# Patient Record
Sex: FEMALE | Race: WHITE | Employment: FULL TIME | URBAN - METROPOLITAN AREA
[De-identification: names, ages, dates, MRNs, and addresses within clinical notes are randomized per-mention and may not be internally consistent; named-entity substitution may affect disease eponyms.]

---

## 2017-08-09 ENCOUNTER — HOSPITAL ENCOUNTER (EMERGENCY)
Facility: CLINIC | Age: 27
Discharge: HOME | End: 2017-08-09
Attending: EMERGENCY MEDICINE | Admitting: EMERGENCY MEDICINE

## 2017-08-09 VITALS
TEMPERATURE: 97.9 F | DIASTOLIC BLOOD PRESSURE: 79 MMHG | BODY MASS INDEX: 26.98 KG/M2 | RESPIRATION RATE: 16 BRPM | WEIGHT: 146.6 LBS | HEIGHT: 62 IN | SYSTOLIC BLOOD PRESSURE: 117 MMHG | OXYGEN SATURATION: 100 %

## 2017-08-09 DIAGNOSIS — F41.9 ANXIETY: ICD-10-CM

## 2017-08-09 PROCEDURE — 90791 PSYCH DIAGNOSTIC EVALUATION: CPT

## 2017-08-09 PROCEDURE — 99285 EMERGENCY DEPT VISIT HI MDM: CPT | Mod: 25

## 2017-08-09 RX ORDER — PAROXETINE 10 MG/1
10 TABLET, FILM COATED ORAL AT BEDTIME
Qty: 21 TABLET | Refills: 0 | Status: SHIPPED | OUTPATIENT
Start: 2017-08-09

## 2017-08-09 ASSESSMENT — ENCOUNTER SYMPTOMS
NERVOUS/ANXIOUS: 1
HALLUCINATIONS: 0

## 2017-08-09 NOTE — DISCHARGE INSTRUCTIONS
Start back on the paxil 10 mg daily  After one week, increase to 20 mg daily  Follow up with primary care provider  May want to schedule a psychiatry appointment  Follow up with therapy    Discharge Instructions  Mental Health Concerns    You were seen today for mental health concerns, such as depression, anxiety, or suicidal thinking. Your provider feels that you do not require hospitalization at this time. However, your symptoms may become worse, and you may need to return to the Emergency Department. Most treatments of depression and suicidal thoughts are a process rather than a single intervention.  Medications and counseling can take several weeks or more to help.    Generally, every Emergency Department visit should have a follow-up clinic visit with either a primary or a specialty clinic/provider. Please follow-up as instructed by your emergency provider today.    By accepting these discharge instructions:    You promise to not harm yourself or others.    You agree that if you feel you are becoming unable to keep that promise, you will do something to help yourself before you do anything to harm yourself or others.     You agree to keep any safety plan arranged on your visit here today.    You agree to take any medication prescribed or recommended by your provider.    If you are getting worse, you can contact a friend or a family member, contact your counselor or family provider, contact a crisis line, or other options discussed with the provider or therapist today.    At any time, you can call 911 and return to the Emergency Department for more help.    You understand that follow-up is essential to your treatment, and you will make and keep appointments recommended on your visit today.    How to improve your mental health and prevent suicide:    Involve others by letting family, friends, counselors know.  Do not isolate yourself.    Avoid alcohol or drugs. Remove weapons, poisons from your home.    Try to  stick to routines for eating, sleeping and getting regular exercise.      Try to get into sunlight. Bright natural light not only treats seasonal affective disorder but also depression.    Increase safe activities that you enjoy.    If you feel worse, contact [phone] ([phone]), or call 911, or your primary provider/counselor for additional assistance.    If you were given a prescription for medicine here today, be sure to read all of the information (including the package insert) that comes with your prescription.  This will include important information about the medicine, its side effects, and any warnings that you need to know about.  The pharmacist who fills the prescription can provide more information and answer questions you may have about the medicine.  If you have questions or concerns that the pharmacist cannot address, please call or return to the Emergency Department.   Remember that you can always come back to the Emergency Department if you are not able to see your regular provider in the amount of time listed above, if you get any new symptoms, or if there is anything that worries you.

## 2017-08-09 NOTE — ED PROVIDER NOTES
"  History     Chief Complaint:  Anxiety    HPI   Marquita Juárez is a 26 year old female who presents with anxiety. The patient has a history of anxiety and depression for which she has been on Paxil, Xanax, and Buspirone in the past. She had been on 40 mg Paxil for 1 year with good symptomatic relief, however she recently decided to taper off of this because it will need to be done in the future as the drug is not safe for pregnancy and hopes to become pregnant. She tapered for approximately 1 month down from 40 mg to 20 mg, to 10 mg, and has been taking none for the last four days. She states that since she began this taper, she has been feeling \"manic\" with extreme highs and lows, as well as experiencing recurrence of her anxiety and depression. She has had self deprecating thoughts but only at her lowest points. Denies suicidal plan. States that would never harm herself as she has too much to live for. She is not currently having these thoughts, but wishes to stop having her current symptoms. She is currently taking Xanax and 20 mg Buspirone PRN as needed for symptom relief though not taking very often. She states these medications help somewhat, but not to the baseline she was at while on Paxil. The patient has seen both a psychiatrist a long time ago, and a therapist 6 months ago for these symptoms which seemed somewhat helpful. The patient notes she is currently drinking alcohol occasionally, using CBD oil, and smoking marijuana approximately twice a month. At this time she states she is potentially willing to go back on Paxil for symptom management, but knows that she will have to wean off again in the future and does not want to go through this process again.    Allergies:  No Known Drug Allergies    Medications:    Buspirone  Xanax    Past Medical History:    Anxiety  Depression    Past Surgical History:    History reviewed. No pertinent past surgical history.    Family History:    The patient denies any " "relevant family medical history.    Social History:  Smoking Status: No  Smokeless Tobacco: No  Alcohol Use: Yes  Marital Status:       Review of Systems   Psychiatric/Behavioral: Negative for hallucinations, self-injury and suicidal ideas. The patient is nervous/anxious.    All other systems reviewed and are negative.    Physical Exam   Vitals:  Patient Vitals for the past 24 hrs:   BP Temp Temp src Heart Rate Resp SpO2 Height Weight   08/09/17 1736 117/79 - - 67 - 100 % - -   08/09/17 1735 - - - - 16 - - -   08/09/17 1522 136/89 97.9  F (36.6  C) Temporal 71 - 100 % 1.575 m (5' 2\") 66.5 kg (146 lb 9.6 oz)     Physical Exam  General: Resting comfortably on the gurney  Eyes:  The pupils are equal and round  ENT:    Moist mucous membranes  Neck:  Normal range of motion  CV:  Regular rate and rhythm    Skin warm and well perfused   Resp:  Lungs are clear    Non-labored    No rales    No wheezing  MS:  Normal muscular tone  Skin:  No rash or acute skin lesions noted  Neuro:   Awake, alert.      Speech is normal and fluent.    Face is symmetric.     Moves all extremities equally  Psych:  Patient is tearful, good eye contact    Emergency Department Course     Emergency Department Course:  Nursing notes and vitals reviewed.  I performed an exam of the patient as documented above.   I spoke with DEC regarding this patient.  1650 I spoke with Dr. Lozano regarding this patient.    I discussed the treatment plan with the patient. They expressed understanding of this plan and consented to discharge. They will be discharged home with instructions for care and follow up. In addition, the patient will return to the emergency department if their symptoms persist, worsen, if new symptoms arise or if there is any concern.  All questions were answered.    Impression & Plan      Medical Decision Making:  Marquita Juárez is a 26 year old female who presents to the emergency department today with anxiety. She feels up and down " recently and she attributes this to her weaning off of the depression medication. She is willing to go back on this, though long term she would ideally like to be off of it. She denies any suicidal ideation and I do not think patient requires hospitalization. DEC agrees that hospitalization is not indicated and was able to set up therapy for tomorrow. I did discuss patient with her primary care provider who recommended restarting the medication at 10 mg daily for a week then can go to 20 mg for the second week. In the meantime I discussed that the patient should schedule an appointment with her primary care provider to get further recommendations regarding this. May benefit from psychiatry evaluation to help with long term planning of a possible safe medication in pregnancy and she was given information on psychiatrists in area. The patient is agreeable to this plan. Does not want admission to hospital at this time and don't think that she meets criteria for this. Reasons to return to the ED were discussed with the patient.     Diagnosis:    ICD-10-CM    1. Anxiety F41.9      Disposition:   Discharge    Discharge Medications:  Discharge Medication List as of 8/9/2017  5:36 PM      START taking these medications    Details   PARoxetine (PAXIL) 10 MG tablet Take 1 tablet (10 mg) by mouth At Bedtime 10 mg daily for 7 days, then increase to 20 mg daily for next 7 days, Disp-21 tablet, R-0, Local Print           Scribe Disclosure:  I, David Davey, am serving as a scribe at 3:29 PM on 8/9/2017 to document services personally performed by Megan Baldwin MD, based on my observations and the provider's statements to me.    8/9/2017    EMERGENCY DEPARTMENT       Megan Baldwin MD  08/10/17 0237

## 2017-08-09 NOTE — ED AVS SNAPSHOT
Emergency Department    [address] [place] [address]    Phone:  [phone]    Fax:  [phone]                                       Marquita Juárez   MRN: [id number]    Department:   Emergency Department   Date of Visit:  8/9/2017           After Visit Summary Signature Page     I have received my discharge instructions, and my questions have been answered. I have discussed any challenges I see with this plan with the nurse or doctor.    ..........................................................................................................................................  Patient/Patient Representative Signature      ..........................................................................................................................................  Patient Representative Print Name and Relationship to Patient    ..................................................               ................................................  Date                                            Time    ..........................................................................................................................................  Reviewed by Signature/Title    ...................................................              ..............................................  Date                                                            Time

## 2017-08-09 NOTE — ED AVS SNAPSHOT
Emergency Department    [address] [facility] [address]    Phone:  [phone]    Fax:  [phone]                                       Marquita Juárez   MRN: [id number]    Department:   Emergency Department   Date of Visit:  8/9/2017           Patient Information     Date Of Birth          1990        Your diagnoses for this visit were:     Anxiety        You were seen by Megan Baldwin MD.      Follow-up Information     Schedule an appointment as soon as possible for a visit with Alejandrina Lozano MD.    Contact information:    [facility]  [address]  [facility] [address]  [phone]          Follow up with  Emergency Department.    Specialty:  EMERGENCY MEDICINE    Why:  If symptoms worsen    Contact information:    [address] [facility] [address] [phone]        Discharge Instructions       Start back on the paxil 10 mg daily  After one week, increase to 20 mg daily  Follow up with primary care provider  May want to schedule a psychiatry appointment  Follow up with therapy    Discharge Instructions  Mental Health Concerns    You were seen today for mental health concerns, such as depression, anxiety, or suicidal thinking. Your provider feels that you do not require hospitalization at this time. However, your symptoms may become worse, and you may need to return to the Emergency Department. Most treatments of depression and suicidal thoughts are a process rather than a single intervention.  Medications and counseling can take several weeks or more to help.    Generally, every Emergency Department visit should have a follow-up clinic visit with either a primary or a specialty clinic/provider. Please follow-up as instructed by your emergency provider today.    By accepting these discharge instructions:    You promise to not harm yourself or others.    You agree that if you feel you are becoming unable to keep that promise, you  will do something to help yourself before you do anything to harm yourself or others.     You agree to keep any safety plan arranged on your visit here today.    You agree to take any medication prescribed or recommended by your provider.    If you are getting worse, you can contact a friend or a family member, contact your counselor or family provider, contact a crisis line, or other options discussed with the provider or therapist today.    At any time, you can call 911 and return to the Emergency Department for more help.    You understand that follow-up is essential to your treatment, and you will make and keep appointments recommended on your visit today.    How to improve your mental health and prevent suicide:    Involve others by letting family, friends, counselors know.  Do not isolate yourself.    Avoid alcohol or drugs. Remove weapons, poisons from your home.    Try to stick to routines for eating, sleeping and getting regular exercise.      Try to get into sunlight. Bright natural light not only treats seasonal affective disorder but also depression.    Increase safe activities that you enjoy.    If you feel worse, contact [phone] ([phone]), or call 911, or your primary provider/counselor for additional assistance.    If you were given a prescription for medicine here today, be sure to read all of the information (including the package insert) that comes with your prescription.  This will include important information about the medicine, its side effects, and any warnings that you need to know about.  The pharmacist who fills the prescription can provide more information and answer questions you may have about the medicine.  If you have questions or concerns that the pharmacist cannot address, please call or return to the Emergency Department.   Remember that you can always come back to the Emergency Department if you are not able to see your regular provider in the amount of time listed  above, if you get any new symptoms, or if there is anything that worries you.    24 Hour Appointment Hotline       To make an appointment at any [facility], call [phone] ([phone]). If you don't have a family doctor or clinic, we will help you find one. [facility] clinics are conveniently located to serve the needs of you and your family.             Review of your medicines      START taking        Dose / Directions Last dose taken    PARoxetine 10 MG tablet   Commonly known as:  PAXIL   Dose:  10 mg   Quantity:  21 tablet        Take 1 tablet (10 mg) by mouth At Bedtime 10 mg daily for 7 days, then increase to 20 mg daily for next 7 days   Refills:  0                Prescriptions were sent or printed at these locations (1 Prescription)                   Other Prescriptions                Printed at Department/Unit printer (1 of 1)         PARoxetine (PAXIL) 10 MG tablet                Orders Needing Specimen Collection     None      Pending Results     No orders found from 8/7/2017 to 8/10/2017.            Pending Culture Results     No orders found from 8/7/2017 to 8/10/2017.            Pending Results Instructions     If you had any lab results that were not finalized at the time of your Discharge, you can call the ED Lab Result RN at [phone]. You will be contacted by this team for any positive Lab results or changes in treatment. The nurses are available 7 days a week from 10A to 6:30P.  You can leave a message 24 hours per day and they will return your call.        Test Results From Your Hospital Stay               Clinical Quality Measure: Blood Pressure Screening     Your blood pressure was checked while you were in the emergency department today. The last reading we obtained was  BP: 136/89 . Please read the guidelines below about what these numbers mean and what you should do about them.  If your systolic blood pressure (the top number) is less than 120 and your diastolic blood  "pressure (the bottom number) is less than 80, then your blood pressure is normal. There is nothing more that you need to do about it.  If your systolic blood pressure (the top number) is 120-139 or your diastolic blood pressure (the bottom number) is 80-89, your blood pressure may be higher than it should be. You should have your blood pressure rechecked within a year by a primary care provider.  If your systolic blood pressure (the top number) is 140 or greater or your diastolic blood pressure (the bottom number) is 90 or greater, you may have high blood pressure. High blood pressure is treatable, but if left untreated over time it can put you at risk for heart attack, stroke, or kidney failure. You should have your blood pressure rechecked by a primary care provider within the next 4 weeks.  If your provider in the emergency department today gave you specific instructions to follow-up with your doctor or provider even sooner than that, you should follow that instruction and not wait for up to 4 weeks for your follow-up visit.        Thank you for choosing [facility]       Thank you for choosing [facility] for your care. Our goal is always to provide you with excellent care. Hearing back from our patients is one way we can continue to improve our services. Please take a few minutes to complete the written survey that you may receive in the mail after you visit with us. Thank you!        [employer] Information     [employer] lets you send messages to your doctor, view your test results, renew your prescriptions, schedule appointments and more. To sign up, go to [email or web address] . Click on \"Log in\" on the left side of the screen, which will take you to the Welcome page. Then click on \"Sign up Now\" on the right side of the page.     You will be asked to enter the access code listed below, as well as some personal information. Please follow the directions to create your username and password.     Your access code is: " [email or web address]  Expires: 2017  5:26 PM     Your access code will  in 90 days. If you need help or a new code, please call your [facility] clinic or [phone].        Care EveryWhere ID     This is your Care EveryWhere ID. This could be used by other organizations to access your [facility] medical records  [id number]        Equal Access to Services     VIOLET CHOU : Hadii eunice kingo [place], waaxda luqadaha, qaybta kaalmada adeegyada, eddie raymundo . So [facility] [phone].    ATENCIÓN: Si habla español, tiene a rojas disposición servicios gratuitos de asistencia lingüística. Llame al [phone].    We comply with applicable federal civil rights laws and [place] laws. We do not discriminate on the basis of race, color, national origin, age, disability sex, sexual orientation or gender identity.            After Visit Summary       This is your record. Keep this with you and show to your community pharmacist(s) and doctor(s) at your next visit.

## 2018-01-21 ENCOUNTER — HEALTH MAINTENANCE LETTER (OUTPATIENT)
Age: 28
End: 2018-01-21

## 2021-01-14 ENCOUNTER — TRANSCRIBE ORDERS (OUTPATIENT)
Dept: MATERNAL FETAL MEDICINE | Facility: CLINIC | Age: 31
End: 2021-01-14

## 2021-01-14 ENCOUNTER — PRE VISIT (OUTPATIENT)
Dept: MATERNAL FETAL MEDICINE | Facility: CLINIC | Age: 31
End: 2021-01-14

## 2021-01-14 DIAGNOSIS — O26.90 PREGNANCY RELATED CONDITION, ANTEPARTUM: Primary | ICD-10-CM

## 2021-01-15 ENCOUNTER — HOSPITAL ENCOUNTER (OUTPATIENT)
Dept: ULTRASOUND IMAGING | Facility: CLINIC | Age: 31
End: 2021-01-15
Attending: OBSTETRICS & GYNECOLOGY
Payer: COMMERCIAL

## 2021-01-15 ENCOUNTER — OFFICE VISIT (OUTPATIENT)
Dept: MATERNAL FETAL MEDICINE | Facility: CLINIC | Age: 31
End: 2021-01-15
Attending: OBSTETRICS & GYNECOLOGY
Payer: COMMERCIAL

## 2021-01-15 DIAGNOSIS — O26.90 PREGNANCY RELATED CONDITION, ANTEPARTUM: ICD-10-CM

## 2021-01-15 DIAGNOSIS — O35.8XX0 UMBILICAL VEIN ABNORMALITY AFFECTING PREGNANCY, SINGLE OR UNSPECIFIED FETUS: ICD-10-CM

## 2021-01-15 PROCEDURE — 76811 OB US DETAILED SNGL FETUS: CPT

## 2021-01-15 PROCEDURE — 76811 OB US DETAILED SNGL FETUS: CPT | Mod: 26 | Performed by: OBSTETRICS & GYNECOLOGY

## 2021-01-15 NOTE — PROGRESS NOTES
"Please see \"Imaging\" tab under Chart Review for full details.    Nisha Irving MD  Maternal Fetal Medicine    "

## 2021-02-05 ENCOUNTER — HOSPITAL ENCOUNTER (OUTPATIENT)
Dept: ULTRASOUND IMAGING | Facility: CLINIC | Age: 31
End: 2021-02-05
Attending: OBSTETRICS & GYNECOLOGY
Payer: COMMERCIAL

## 2021-02-05 ENCOUNTER — OFFICE VISIT (OUTPATIENT)
Dept: MATERNAL FETAL MEDICINE | Facility: CLINIC | Age: 31
End: 2021-02-05
Attending: OBSTETRICS & GYNECOLOGY
Payer: COMMERCIAL

## 2021-02-05 DIAGNOSIS — O35.8XX0 UMBILICAL VEIN ABNORMALITY AFFECTING PREGNANCY, SINGLE OR UNSPECIFIED FETUS: Primary | ICD-10-CM

## 2021-02-05 PROCEDURE — 76816 OB US FOLLOW-UP PER FETUS: CPT

## 2021-02-05 PROCEDURE — 76816 OB US FOLLOW-UP PER FETUS: CPT | Mod: 26 | Performed by: OBSTETRICS & GYNECOLOGY

## 2021-02-05 NOTE — PROGRESS NOTES
Please see full imaging report from [employer] program under imaging tab.    Paco Polo MD  Maternal Fetal Medicine

## 2025-07-09 ENCOUNTER — HOSPITAL ENCOUNTER (OUTPATIENT)
Facility: CLINIC | Age: 35
End: 2025-07-09
Admitting: OBSTETRICS & GYNECOLOGY
Payer: COMMERCIAL

## 2025-07-09 ENCOUNTER — HOSPITAL ENCOUNTER (OUTPATIENT)
Facility: CLINIC | Age: 35
Discharge: HOME | End: 2025-07-09
Attending: OBSTETRICS & GYNECOLOGY | Admitting: OBSTETRICS & GYNECOLOGY
Payer: COMMERCIAL

## 2025-07-09 VITALS — SYSTOLIC BLOOD PRESSURE: 114 MMHG | TEMPERATURE: 97.9 F | DIASTOLIC BLOOD PRESSURE: 68 MMHG | RESPIRATION RATE: 20 BRPM

## 2025-07-09 PROBLEM — Z36.89 ENCOUNTER FOR TRIAGE IN PREGNANT PATIENT: Status: ACTIVE | Noted: 2025-07-09

## 2025-07-09 LAB
ALBUMIN UR-MCNC: NEGATIVE MG/DL
AMORPH CRY #/AREA URNS HPF: ABNORMAL /HPF
APPEARANCE UR: ABNORMAL
BACTERIA #/AREA URNS HPF: ABNORMAL /HPF
BILIRUB UR QL STRIP: NEGATIVE
COLOR UR AUTO: ABNORMAL
GLUCOSE UR STRIP-MCNC: NEGATIVE MG/DL
HGB UR QL STRIP: NEGATIVE
KETONES UR STRIP-MCNC: NEGATIVE MG/DL
LEUKOCYTE ESTERASE UR QL STRIP: NEGATIVE
MUCOUS THREADS #/AREA URNS LPF: PRESENT /LPF
NITRATE UR QL: NEGATIVE
PH UR STRIP: 7.5 [PH] (ref 5–7)
RBC URINE: 1 /HPF
SP GR UR STRIP: 1.02 (ref 1–1.03)
UROBILINOGEN UR STRIP-MCNC: NORMAL MG/DL
WBC URINE: 0 /HPF

## 2025-07-09 PROCEDURE — 81001 URINALYSIS AUTO W/SCOPE: CPT | Performed by: OBSTETRICS & GYNECOLOGY

## 2025-07-09 PROCEDURE — 250N000013 HC RX MED GY IP 250 OP 250 PS 637: Performed by: OBSTETRICS & GYNECOLOGY

## 2025-07-09 PROCEDURE — G0463 HOSPITAL OUTPT CLINIC VISIT: HCPCS

## 2025-07-09 RX ORDER — HYDROXYZINE HYDROCHLORIDE 50 MG/1
100 TABLET, FILM COATED ORAL ONCE
Status: COMPLETED | OUTPATIENT
Start: 2025-07-09 | End: 2025-07-09

## 2025-07-09 RX ORDER — HYDROXYZINE HYDROCHLORIDE 50 MG/1
TABLET, FILM COATED ORAL
Status: COMPLETED
Start: 2025-07-09 | End: 2025-07-09

## 2025-07-09 RX ADMIN — HYDROXYZINE HYDROCHLORIDE 100 MG: 50 TABLET, FILM COATED ORAL at 17:56

## 2025-07-09 ASSESSMENT — ACTIVITIES OF DAILY LIVING (ADL)
ADLS_ACUITY_SCORE: 41
ADLS_ACUITY_SCORE: 15
ADLS_ACUITY_SCORE: 15

## 2025-07-09 NOTE — DISCHARGE INSTRUCTIONS
Learning About When to Call Your Doctor During Pregnancy (After 20 Weeks)  Overview  It's common to have concerns about what might be a problem when you're pregnant. Most pregnancies don't have any serious problems. But it's still important to know when to call your doctor if you have certain symptoms or signs of labor.  These are general suggestions. Your doctor may give you some more information about when to call.  When to call your doctor (after 20 weeks)  Call 911 anytime you think you may need emergency care. For example, call if:  You have severe vaginal bleeding. You have soaked through one or more pads in an hour, and the bleeding is not slowing down.  You have sudden, severe pain in your belly that does not go away.  You have chest pain, are short of breath, or cough up blood.  You passed out (lost consciousness).  You have a seizure.  You see or feel the umbilical cord.  You think you are about to deliver your baby and can't make it safely to the hospital or birthing center.  Call your doctor now or seek immediate medical care if:  You have vaginal bleeding.  You have belly pain.  You have a fever.  You are dizzy or lightheaded, or you feel like you may faint.  You have signs of a blood clot in your leg (called a deep vein thrombosis), such as:  Pain in the calf, back of the knee, thigh, or groin.  Swelling in your leg or groin.  A color change on the leg or groin. The skin may be reddish or purplish.  You have symptoms of preeclampsia, such as:  Sudden swelling of your face, hands, or feet.  New vision problems (such as dimness, blurring, or seeing spots).  A severe headache that will not go away.  You have a sudden release or slow trickle of fluid from your vagina. This may mean your water has broken.  You've been having regular contractions for an hour at less than 37 weeks. This means that you've had at least 6 contractions within 1 hour, even after you change your position and drink fluids.  You  "notice that your baby has stopped moving or is moving less than normal.  You have signs of heart failure, such as:  New or increased shortness of breath.  New or worse swelling in your legs, ankles, or feet.  Sudden weight gain, such as more than 2 to 3 pounds in a day or 5 pounds in a week.  Feeling so tired or weak that you cannot do your usual activities.  You have symptoms of a urinary tract infection. These may include:  Pain or burning when you urinate.  A frequent need to urinate without being able to pass much urine.  Pain in your low back (below the rib cage and above the waist).  Blood in your urine.  Watch closely for changes in your health, and be sure to contact your doctor if:  You have vaginal discharge that smells bad.  You feel sad, anxious, or hopeless for more than a few days.  You have skin changes, such as a rash, itching, or a yellow color to your skin.  You have other concerns about your pregnancy.  If you have labor signs at 37 weeks or more  If you have signs of labor at 37 weeks or more, your doctor may tell you to call when your labor becomes more active. During active labor:  Contractions happen more often and at regular intervals (about every 3 to 5 minutes).  Contractions last longer (about 60 seconds or more).  Contractions get stronger and are hard to talk through.  Follow-up care is a key part of your treatment and safety. Be sure to make and go to all appointments, and call your doctor if you are having problems. It's also a good idea to know your test results and keep a list of the medicines you take.  Where can you learn more?  Go to https://www.Clik[employer].net/patiented  Enter N531 in the search box to learn more about \"Learning About When to Call Your Doctor During Pregnancy (After 20 Weeks).\"  Current as of: April 30, 2024  Content Version: 14.5    8124-6489 [facility] [employer].   Care instructions adapted under license by your healthcare professional. If you have questions " about a medical condition or this instruction, always ask your healthcare professional. [employer] disclaims any warranty or liability for your use of this information.

## 2025-07-09 NOTE — PLAN OF CARE
Dr. Bedolla updated regarding UC's having slowed down after emptying her bladder, fht's 125 with moderate variability, accels present and no decels noted. No VB/LOF. SVE 0/-2 UA results reviewed with provider. Order received for one time dose of PO Hydroxyzine 100mg and discharge to home.     Discharge instructions and labor precautions reviewed with Brittney and questions answered. Marquita denies additional concerns at this time.   Pt d/c'd home ambulatory.

## 2025-07-09 NOTE — PLAN OF CARE
"Marquita presents via wheelchair accompanied by her  Sundar for evaluation of abdominal pain. Pt to MAC 2, oriented to room and call light, EFM/toco monitors applied with verbal consent. Fht's present.     Marquita is a  39w1d of OGI. She states that she \"had a panic attack around 1030 this morning\" and shortkly after started feeling lower abdominal cramping and back pain that \"felt like contractions\". They continued into the afternoon and she also felt like she couldn't empty her bladder. She reports +FM, denies headache, visual changes, epigastric pain, VB or LOF. She has a known history of anxiety and has recently adjusted her medications, which she feels could be contributing to her feeling anxious.     On the monitor she is starr every 3-4 minutes and says they are \"uncomfortable\". SVE performed with verbal consent and she is closed/-3. No fluid or bleeding was observed on exam.     Pt up to the bathroom and UA collected and sent to lab. Awaiting results.       "